# Patient Record
Sex: FEMALE | URBAN - METROPOLITAN AREA
[De-identification: names, ages, dates, MRNs, and addresses within clinical notes are randomized per-mention and may not be internally consistent; named-entity substitution may affect disease eponyms.]

---

## 2019-04-23 ENCOUNTER — Encounter (OUTPATIENT)
Dept: URBAN - METROPOLITAN AREA CLINIC 72 | Facility: CLINIC | Age: 84
End: 2019-04-23
Payer: MEDICARE

## 2019-04-23 PROCEDURE — 99213 OFFICE O/P EST LOW 20 MIN: CPT | Performed by: OPTOMETRIST

## 2020-11-10 ENCOUNTER — OFFICE VISIT (OUTPATIENT)
Dept: URBAN - METROPOLITAN AREA CLINIC 73 | Facility: CLINIC | Age: 85
End: 2020-11-10
Payer: MEDICARE

## 2020-11-10 DIAGNOSIS — H04.123 DRY EYE SYNDROME OF BILATERAL LACRIMAL GLANDS: ICD-10-CM

## 2020-11-10 PROCEDURE — 99214 OFFICE O/P EST MOD 30 MIN: CPT | Performed by: OPHTHALMOLOGY

## 2020-11-10 ASSESSMENT — INTRAOCULAR PRESSURE
OD: 13
OS: 14

## 2020-11-10 NOTE — IMPRESSION/PLAN
Impression: ERM, OD. Status: Symptomatic. Plan: Exam and OCT reveal ERM OD (232 microns, from 228 microns). An IVFA from 02/26/19 demonstrated no telangiectasia. Fundus photos from 02/26/19 demonstrated no IRH. Observe.

## 2020-11-10 NOTE — IMPRESSION/PLAN
Impression: ERM, OS. Status: Symptomatic. Plan: There has been worsening vision. Exam and OCT reveal an ERM with VMT OS (252 microns, from 209 microns). An IVFA from 2/26/19 demonstrated no telangiectasia. Fundus photos from 2/26/19 demonstrated no IRH. Observe at this time. Thanks, Wilbur Slade Return 4 months OCT OU

## 2021-04-08 ENCOUNTER — OFFICE VISIT (OUTPATIENT)
Dept: URBAN - METROPOLITAN AREA CLINIC 41 | Facility: CLINIC | Age: 86
End: 2021-04-08
Payer: MEDICARE

## 2021-04-08 DIAGNOSIS — Z96.1 PRESENCE OF INTRAOCULAR LENS: ICD-10-CM

## 2021-04-08 PROCEDURE — 99214 OFFICE O/P EST MOD 30 MIN: CPT | Performed by: OPHTHALMOLOGY

## 2021-04-08 PROCEDURE — 92134 CPTRZ OPH DX IMG PST SGM RTA: CPT | Performed by: OPHTHALMOLOGY

## 2021-04-08 ASSESSMENT — INTRAOCULAR PRESSURE
OS: 12
OD: 14

## 2021-04-08 NOTE — IMPRESSION/PLAN
Impression: ERM, OS. Status: Symptomatic. Plan: There has been worsening vision. Exam and OCT reveal an ERM with VMT OS (244 microns, from 252 microns, from 209 microns). An IVFA from 2/26/19 demonstrated no telangiectasia. Fundus photos from 2/26/19 demonstrated no IRH. Observe at this time. Thanks, Zeke Nash Return 4 months OCT OU

## 2021-04-08 NOTE — IMPRESSION/PLAN
Impression: ERM, OD. Status: Symptomatic. Plan: Exam and OCT reveal ERM OD (244 microns, from 232 microns, from 228 microns). An IVFA from 02/26/19 demonstrated no telangiectasia. Fundus photos from 02/26/19 demonstrated no IRH. Observe.

## 2022-02-15 ENCOUNTER — OFFICE VISIT (OUTPATIENT)
Dept: URBAN - METROPOLITAN AREA CLINIC 73 | Facility: CLINIC | Age: 87
End: 2022-02-15
Payer: MEDICARE

## 2022-02-15 DIAGNOSIS — H43.822 VITREOMACULAR ADHESION, LEFT EYE: ICD-10-CM

## 2022-02-15 PROCEDURE — 99214 OFFICE O/P EST MOD 30 MIN: CPT | Performed by: OPHTHALMOLOGY

## 2022-02-15 PROCEDURE — 92134 CPTRZ OPH DX IMG PST SGM RTA: CPT | Performed by: OPHTHALMOLOGY

## 2022-02-15 ASSESSMENT — INTRAOCULAR PRESSURE
OS: 11
OD: 12

## 2022-02-15 NOTE — IMPRESSION/PLAN
Impression: ERM, OD. Status: Symptomatic. Plan: Exam and OCT reveal ERM OD (227 microns, from 244 microns). An IVFA from 02/26/19 demonstrated no telangiectasia. Fundus photos from 02/26/19 demonstrated no IRH. Observe.

## 2022-02-15 NOTE — IMPRESSION/PLAN
Impression: ERM, OS. Status: Symptomatic. Plan: Exam and OCT reveal an ERM with VMT OS (221 microns, from 252 microns). An IVFA from 2/26/19 demonstrated no telangiectasia. Fundus photos from 2/26/19 demonstrated no IRH. Observe at this time. Thanks, Camden Goff Return 4 months OCT OU, IVFA OS 1st

## 2022-03-30 ENCOUNTER — OFFICE VISIT (OUTPATIENT)
Dept: URBAN - METROPOLITAN AREA CLINIC 71 | Facility: CLINIC | Age: 87
End: 2022-03-30
Payer: MEDICARE

## 2022-03-30 DIAGNOSIS — H43.813 VITREOUS DEGENERATION, BILATERAL: ICD-10-CM

## 2022-03-30 DIAGNOSIS — H35.373 PUCKERING OF MACULA, BILATERAL: Primary | ICD-10-CM

## 2022-03-30 DIAGNOSIS — H43.821 VITREOMACULAR TRACTION OF RIGHT EYE: ICD-10-CM

## 2022-03-30 PROCEDURE — 99203 OFFICE O/P NEW LOW 30 MIN: CPT | Performed by: OPHTHALMOLOGY

## 2022-03-30 PROCEDURE — 92134 CPTRZ OPH DX IMG PST SGM RTA: CPT | Performed by: OPHTHALMOLOGY

## 2022-03-30 ASSESSMENT — INTRAOCULAR PRESSURE
OS: 11
OD: 12

## 2022-03-30 NOTE — IMPRESSION/PLAN
Impression: Vitreous degeneration, bilateral: H43.813 Bilateral.
No holes or tears seen. Plan: Contact office if symptoms worsen, including increased floaters, flashing light, loss of vision or a black curtain blocking your field of vision.

## 2022-03-30 NOTE — IMPRESSION/PLAN
Impression: Puckering of macula, bilateral: H35.373 Bilateral.
OCT ordered today and reviewed. Traction is seen on OD - discussed with patient. No leakage present at this time. Plan: Keep appointments as scheduled with Dr. Viv Sanders. Contact office if you experience a loss of vision or distortion. Patient to return to our care for dilation and OCT testing annually.

## 2022-03-30 NOTE — IMPRESSION/PLAN
Impression: Dry eye syndrome of bilateral lacrimal glands: H04.123 Bilateral. Plan: Recommend use of artificial tears as needed.

## 2022-03-30 NOTE — IMPRESSION/PLAN
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 About 50% of these cases will spontaneously resolve as the vitreous separates from the retina. Plan: Occasionally, the traction will progress to a full thickness macular hole. I recommend observation for spontaneous resolution at this time.

## 2022-04-21 ENCOUNTER — OFFICE VISIT (OUTPATIENT)
Dept: URBAN - METROPOLITAN AREA CLINIC 41 | Facility: CLINIC | Age: 87
End: 2022-04-21
Payer: MEDICARE

## 2022-04-21 DIAGNOSIS — H43.822 VITREOMACULAR ADHESION, LEFT EYE: ICD-10-CM

## 2022-04-21 DIAGNOSIS — Z96.1 PRESENCE OF INTRAOCULAR LENS: ICD-10-CM

## 2022-04-21 DIAGNOSIS — H04.123 DRY EYE SYNDROME OF BILATERAL LACRIMAL GLANDS: ICD-10-CM

## 2022-04-21 DIAGNOSIS — H35.373 PUCKERING OF MACULA, BILATERAL: Primary | ICD-10-CM

## 2022-04-21 PROCEDURE — 92134 CPTRZ OPH DX IMG PST SGM RTA: CPT | Performed by: OPHTHALMOLOGY

## 2022-04-21 PROCEDURE — 92235 FLUORESCEIN ANGRPH MLTIFRAME: CPT | Performed by: OPHTHALMOLOGY

## 2022-04-21 PROCEDURE — 99214 OFFICE O/P EST MOD 30 MIN: CPT | Performed by: OPHTHALMOLOGY

## 2022-04-21 ASSESSMENT — INTRAOCULAR PRESSURE
OS: 11
OD: 12

## 2022-04-21 NOTE — IMPRESSION/PLAN
Impression: ERM, OD. Status: Symptomatic. Plan: Exam and OCT reveal ERM OD (245 microns, from 244 microns). An IVFA from 04/21/2022 demonstrated no telangiectasia. Fundus photos from 04/21/2022 demonstrated no IRH. Observe.

## 2022-04-21 NOTE — IMPRESSION/PLAN
Impression: ERM, OS. Status: Symptomatic. Plan: Exam and OCT reveal an ERM with VMT OS (242 microns, from 252 microns). An IVFA from 04/21/2022 demonstrated no telangiectasia. Fundus photos from 04/21/2022 demonstrated no IRH. Observe at this time. Thanks, Milly Ramírez Return 4 months OCT OU

## 2022-08-26 ENCOUNTER — OFFICE VISIT (OUTPATIENT)
Dept: URBAN - METROPOLITAN AREA CLINIC 71 | Facility: CLINIC | Age: 87
End: 2022-08-26
Payer: MEDICARE

## 2022-08-26 DIAGNOSIS — H10.89 OTHER CONJUNCTIVITIS: Primary | ICD-10-CM

## 2022-08-26 PROCEDURE — 99212 OFFICE O/P EST SF 10 MIN: CPT | Performed by: OPHTHALMOLOGY

## 2022-08-26 RX ORDER — AZITHROMYCIN MONOHYDRATE 250 MG/1
250 MG TABLET, FILM COATED ORAL
Qty: 6 | Refills: 0 | Status: INACTIVE
Start: 2022-08-26 | End: 2022-08-30

## 2022-08-26 RX ORDER — TOBRAMYCIN AND DEXAMETHASONE 3; 1 MG/ML; MG/ML
SUSPENSION/ DROPS OPHTHALMIC
Qty: 5 | Refills: 0 | Status: ACTIVE
Start: 2022-08-26

## 2022-08-26 ASSESSMENT — INTRAOCULAR PRESSURE
OD: 12
OS: 10

## 2022-08-26 NOTE — IMPRESSION/PLAN
Impression: Other conjunctivitis: H10.89. Inflammation present today on the surface of OS. Lid eversion shows thickening CHAYO. Slight tenderness and edema of the UL and LL of OS. Plan: Will start patient on tobradex QID OU OS for 10 days. Will start patient on an oral antibiotic to make sure no secondary inflammation occurs. Start a z-pack. Call if symptoms worsen.

## 2022-09-07 ENCOUNTER — OFFICE VISIT (OUTPATIENT)
Dept: URBAN - METROPOLITAN AREA CLINIC 71 | Facility: CLINIC | Age: 87
End: 2022-09-07
Payer: MEDICARE

## 2022-09-07 DIAGNOSIS — H10.89 OTHER CONJUNCTIVITIS: Primary | ICD-10-CM

## 2022-09-07 PROCEDURE — 99212 OFFICE O/P EST SF 10 MIN: CPT | Performed by: OPHTHALMOLOGY

## 2022-09-07 ASSESSMENT — INTRAOCULAR PRESSURE
OD: 9
OS: 9

## 2022-09-07 NOTE — IMPRESSION/PLAN
Impression: Other conjunctivitis: H10.89. Inflammation still present today, but in both eyes instead of just OS. Plan: No recommendation for tobradex at this time as we don't want the patient on a steroid drop for an extended period of time. Recommend OTC allergy drops and artificial tears. Patient should also try hot compresses for 5-10 minutes at a time, multiple times daily.

## 2022-10-04 ENCOUNTER — OFFICE VISIT (OUTPATIENT)
Facility: LOCATION | Age: 87
End: 2022-10-04
Payer: MEDICARE

## 2022-10-04 DIAGNOSIS — H43.822 VITREOMACULAR ADHESION, LEFT EYE: ICD-10-CM

## 2022-10-04 DIAGNOSIS — H04.123 DRY EYE SYNDROME OF BILATERAL LACRIMAL GLANDS: ICD-10-CM

## 2022-10-04 DIAGNOSIS — Z96.1 PRESENCE OF INTRAOCULAR LENS: ICD-10-CM

## 2022-10-04 DIAGNOSIS — H35.373 PUCKERING OF MACULA, BILATERAL: Primary | ICD-10-CM

## 2022-10-04 PROCEDURE — 92134 CPTRZ OPH DX IMG PST SGM RTA: CPT | Performed by: OPHTHALMOLOGY

## 2022-10-04 PROCEDURE — 99214 OFFICE O/P EST MOD 30 MIN: CPT | Performed by: OPHTHALMOLOGY

## 2022-10-04 ASSESSMENT — INTRAOCULAR PRESSURE
OD: 12
OS: 9

## 2022-10-04 NOTE — IMPRESSION/PLAN
Impression: ERM, OD. Status: Symptomatic. Plan: Exam and OCT reveal ERM OD (256 microns, from 244 microns). An IVFA from 04/21/2022 demonstrated no telangiectasia. Fundus photos from 04/21/2022 demonstrated no IRH. Observe.

## 2022-10-04 NOTE — IMPRESSION/PLAN
Impression: ERM, OS. Status: Symptomatic. Plan: Exam and OCT reveal an ERM with VMT OS (251 microns, from 252 microns). An IVFA from 04/21/2022 demonstrated no telangiectasia. Fundus photos from 04/21/2022 demonstrated no IRH. Observe at this time. Thanks, Wilbur Slade Return 4 months OCT OU

## 2023-02-28 ENCOUNTER — OFFICE VISIT (OUTPATIENT)
Facility: LOCATION | Age: 88
End: 2023-02-28
Payer: MEDICARE

## 2023-02-28 DIAGNOSIS — H04.123 DRY EYE SYNDROME OF BILATERAL LACRIMAL GLANDS: ICD-10-CM

## 2023-02-28 DIAGNOSIS — Z96.1 PRESENCE OF INTRAOCULAR LENS: ICD-10-CM

## 2023-02-28 DIAGNOSIS — H35.373 PUCKERING OF MACULA, BILATERAL: Primary | ICD-10-CM

## 2023-02-28 DIAGNOSIS — H43.822 VITREOMACULAR ADHESION, LEFT EYE: ICD-10-CM

## 2023-02-28 PROCEDURE — 99214 OFFICE O/P EST MOD 30 MIN: CPT | Performed by: OPHTHALMOLOGY

## 2023-02-28 PROCEDURE — 92134 CPTRZ OPH DX IMG PST SGM RTA: CPT | Performed by: OPHTHALMOLOGY

## 2023-02-28 ASSESSMENT — INTRAOCULAR PRESSURE
OS: 13
OD: 21

## 2023-02-28 NOTE — IMPRESSION/PLAN
Impression: ERM, OD. Status: Symptomatic. Plan: Exam and OCT reveal ERM OD (307 microns, from 244 microns). An IVFA from 04/21/2022 demonstrated no telangiectasia. Fundus photos from 04/21/2022 demonstrated no IRH. Observe.

## 2023-02-28 NOTE — IMPRESSION/PLAN
Impression: ERM, OS. Status: Symptomatic. Plan: Exam and OCT reveal an ERM with VMT OS (241 microns, from 252 microns). An IVFA from 04/21/2022 demonstrated no telangiectasia. Fundus photos from 04/21/2022 demonstrated no IRH. Observe at this time. Thanks, Sandrine Nayak Return 24 months OCT OU

## 2023-04-07 ENCOUNTER — OFFICE VISIT (OUTPATIENT)
Dept: URBAN - METROPOLITAN AREA CLINIC 71 | Facility: CLINIC | Age: 88
End: 2023-04-07
Payer: MEDICARE

## 2023-04-07 DIAGNOSIS — Z96.1 PRESENCE OF INTRAOCULAR LENS: ICD-10-CM

## 2023-04-07 DIAGNOSIS — H04.123 DRY EYE SYNDROME OF BILATERAL LACRIMAL GLANDS: ICD-10-CM

## 2023-04-07 DIAGNOSIS — H35.373 PUCKERING OF MACULA, BILATERAL: Primary | ICD-10-CM

## 2023-04-07 DIAGNOSIS — H43.821 VITREOMACULAR TRACTION OF RIGHT EYE: ICD-10-CM

## 2023-04-07 PROCEDURE — 99213 OFFICE O/P EST LOW 20 MIN: CPT | Performed by: OPHTHALMOLOGY

## 2023-04-07 PROCEDURE — 92134 CPTRZ OPH DX IMG PST SGM RTA: CPT | Performed by: OPHTHALMOLOGY

## 2023-04-07 ASSESSMENT — INTRAOCULAR PRESSURE
OD: 12
OS: 9

## 2023-04-07 NOTE — IMPRESSION/PLAN
Impression: Dry eye syndrome of bilateral lacrimal glands: H04.123 Bilateral. Plan: continue use of artificial tears as needed.

## 2023-04-07 NOTE — IMPRESSION/PLAN
Impression: Puckering of macula, bilateral: H35.373 Bilateral.
OCT ordered today and reviewed. No leakage present at this time. Plan: Dr. Rajni Saxena released PT for 2 years. Will continue to follow annually with DE and OCT imaging. If PT notes any  changes or worsening in her vision RTC for evaluation.

## 2024-06-11 ENCOUNTER — OFFICE VISIT (OUTPATIENT)
Facility: LOCATION | Age: 89
End: 2024-06-11
Payer: MEDICARE

## 2024-06-11 DIAGNOSIS — Z96.1 PRESENCE OF INTRAOCULAR LENS: ICD-10-CM

## 2024-06-11 DIAGNOSIS — H35.373 PUCKERING OF MACULA, BILATERAL: Primary | ICD-10-CM

## 2024-06-11 DIAGNOSIS — D31.32 BENIGN NEOPLASM OF LEFT CHOROID: ICD-10-CM

## 2024-06-11 PROCEDURE — 92134 CPTRZ OPH DX IMG PST SGM RTA: CPT | Performed by: STUDENT IN AN ORGANIZED HEALTH CARE EDUCATION/TRAINING PROGRAM

## 2024-06-11 PROCEDURE — 99214 OFFICE O/P EST MOD 30 MIN: CPT | Performed by: STUDENT IN AN ORGANIZED HEALTH CARE EDUCATION/TRAINING PROGRAM

## 2024-06-11 ASSESSMENT — INTRAOCULAR PRESSURE
OD: 16
OS: 15

## 2024-09-04 ENCOUNTER — OFFICE VISIT (OUTPATIENT)
Dept: URBAN - METROPOLITAN AREA CLINIC 71 | Facility: CLINIC | Age: 89
End: 2024-09-04
Payer: MEDICARE

## 2024-09-04 DIAGNOSIS — Z96.1 PRESENCE OF INTRAOCULAR LENS: ICD-10-CM

## 2024-09-04 DIAGNOSIS — D31.32 BENIGN NEOPLASM OF LEFT CHOROID: ICD-10-CM

## 2024-09-04 DIAGNOSIS — H35.373 PUCKERING OF MACULA, BILATERAL: Primary | ICD-10-CM

## 2024-09-04 DIAGNOSIS — H43.821 VITREOMACULAR TRACTION OF RIGHT EYE: ICD-10-CM

## 2024-09-04 PROCEDURE — 99213 OFFICE O/P EST LOW 20 MIN: CPT | Performed by: OPHTHALMOLOGY

## 2024-09-04 ASSESSMENT — INTRAOCULAR PRESSURE
OS: 9
OD: 10

## 2024-09-04 ASSESSMENT — KERATOMETRY
OD: 42.75
OS: 43.13

## 2025-06-04 ENCOUNTER — OFFICE VISIT (OUTPATIENT)
Dept: URBAN - METROPOLITAN AREA CLINIC 71 | Facility: CLINIC | Age: OVER 89
End: 2025-06-04
Payer: MEDICARE

## 2025-06-04 DIAGNOSIS — H00.013 HORDEOLUM EXTERNUM RIGHT EYE, UNSPECIFIED EYELID: ICD-10-CM

## 2025-06-04 DIAGNOSIS — H04.123 DRY EYE SYNDROME OF BILATERAL LACRIMAL GLANDS: ICD-10-CM

## 2025-06-04 DIAGNOSIS — H43.821 VITREOMACULAR TRACTION OF RIGHT EYE: ICD-10-CM

## 2025-06-04 DIAGNOSIS — D31.32 BENIGN NEOPLASM OF LEFT CHOROID: ICD-10-CM

## 2025-06-04 DIAGNOSIS — Z96.1 PRESENCE OF INTRAOCULAR LENS: ICD-10-CM

## 2025-06-04 DIAGNOSIS — H35.373 PUCKERING OF MACULA, BILATERAL: Primary | ICD-10-CM

## 2025-06-04 PROCEDURE — 99214 OFFICE O/P EST MOD 30 MIN: CPT | Performed by: OPHTHALMOLOGY

## 2025-06-04 PROCEDURE — 92133 CPTRZD OPH DX IMG PST SGM ON: CPT | Performed by: OPHTHALMOLOGY

## 2025-06-04 PROCEDURE — 92134 CPTRZ OPH DX IMG PST SGM RTA: CPT | Performed by: OPHTHALMOLOGY

## 2025-06-04 RX ORDER — AZITHROMYCIN MONOHYDRATE 250 MG/1
250 MG TABLET, FILM COATED ORAL
Qty: 6 | Refills: 0 | Status: INACTIVE
Start: 2025-06-04 | End: 2025-06-08

## 2025-06-04 RX ORDER — TOBRAMYCIN AND DEXAMETHASONE 1; 3 MG/ML; MG/ML
SUSPENSION/ DROPS OPHTHALMIC
Qty: 5 | Refills: 0 | Status: ACTIVE
Start: 2025-06-04

## 2025-06-04 ASSESSMENT — INTRAOCULAR PRESSURE
OS: 12
OD: 10